# Patient Record
Sex: MALE | Race: WHITE
[De-identification: names, ages, dates, MRNs, and addresses within clinical notes are randomized per-mention and may not be internally consistent; named-entity substitution may affect disease eponyms.]

---

## 2018-02-08 ENCOUNTER — TRANSCRIPTION ENCOUNTER (OUTPATIENT)
Age: 23
End: 2018-02-08

## 2019-01-31 ENCOUNTER — TRANSCRIPTION ENCOUNTER (OUTPATIENT)
Age: 24
End: 2019-01-31

## 2019-10-01 ENCOUNTER — MEDICATION RENEWAL (OUTPATIENT)
Age: 24
End: 2019-10-01

## 2019-10-03 ENCOUNTER — MEDICATION RENEWAL (OUTPATIENT)
Age: 24
End: 2019-10-03

## 2019-11-15 ENCOUNTER — APPOINTMENT (OUTPATIENT)
Dept: NEUROLOGY | Facility: CLINIC | Age: 24
End: 2019-11-15
Payer: MEDICARE

## 2019-11-15 VITALS
HEIGHT: 69 IN | TEMPERATURE: 98.4 F | SYSTOLIC BLOOD PRESSURE: 119 MMHG | WEIGHT: 265.7 LBS | BODY MASS INDEX: 39.35 KG/M2 | OXYGEN SATURATION: 99 % | DIASTOLIC BLOOD PRESSURE: 69 MMHG | HEART RATE: 75 BPM

## 2019-11-15 DIAGNOSIS — Q04.0 CONGENITAL MALFORMATIONS OF CORPUS CALLOSUM: ICD-10-CM

## 2019-11-15 DIAGNOSIS — Q01.9 ENCEPHALOCELE, UNSPECIFIED: ICD-10-CM

## 2019-11-15 DIAGNOSIS — G40.909 EPILEPSY, UNSPECIFIED, NOT INTRACTABLE, W/OUT STATUS EPILEPTICUS: ICD-10-CM

## 2019-11-15 DIAGNOSIS — Q04.3 OTHER REDUCTION DEFORMITIES OF BRAIN: ICD-10-CM

## 2019-11-15 PROCEDURE — 99204 OFFICE O/P NEW MOD 45 MIN: CPT

## 2019-11-15 RX ORDER — CETIRIZINE HCL 10 MG
10 TABLET ORAL
Refills: 0 | Status: ACTIVE | COMMUNITY

## 2019-11-17 NOTE — HISTORY OF PRESENT ILLNESS
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 Pt is here today as he is being transitioned to adult neurology. Pt is accompanied by mom. As per mom who is the primary historian pt started having seizures at 4 month of age due to hydrocephalies that required  shunt placements with numerous revisions. By age 7-8 pt was doing well and was seizure free. Was taped off all  AEDs and was doing well for about 10 years. At ~ age 18 he started having episodes of starring, spacing out and fine upper body shaking that usually lasted less than a minute and than pt was back to baseline. Pt was placed back on AEDs and is currently taking Keppra 1000 mg po q 12hrs and Zonisamide 150 mg po q 12 hrs. \par Mom thinks pt has been seizure free for about 2 years, however she is not certain as 5 days a week Almas goes to program and she feels that memebers of the team can easily miss it if he does have an episode. Pt saw Dr. Eng 1.5 years ago and had last REEG on 2017 which was normal as well as a VEEG in 2016 which was abnormal (right frontotemporal slowing). \par \par Social: [facility]. Lives at home with mom. Has developmental delays. Pt finished high school. Did not attend college.

## 2019-11-17 NOTE — DISCUSSION/SUMMARY
[Safety Recommendations] : The patient was advised in regards to the risk of seizures and general seizure safety recommendations including not to be bathing alone, climbing to high places and operating heavy machinery. [Bone Health Education] : Patient was educated in regards to bone health and an increased risk of osteoporosis in patients with epilepsy. [Compliance with Medications] : The importance of compliance with medications was reinforced. [Sleep Hygiene/Sleep Disruption Risks] : Sleep hygiene and the risks of sleep disruption were discussed. 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 Seizure disorver is well controlled. Will continue current dose of AEDs, rx given for drug levels to be checked within the next week. Pt will also need a REEG and possible VEEG to evaluate epileptiform activity and subclinical seizures. Spoke with pt and mom at lengths about weight loss and introducing daily exercise. If pt unsuccessful with weight loss will refer pt for sleep study to evaluate his snoring and need of CPAP.\par Case discussed with Dr. Samuels.\par \par Cris Meyer, DNP, ACNP-BC

## 2019-11-17 NOTE — DATA REVIEWED
[de-identified] : date of test 2015\par Clinical History / Reason for exam: Seizures. History of  shunt placement.\par   \par Technique: Sagittal T1 and axial T1, FLAIR, T2, gradient echo and diffusion\par sequences of the brain were obtained without administration of intravenous\par contrast. Multiplanar thin section sequences through the sinuses were also\par obtained.\par   \par COMPARISON: MRI brain September 29, 2013.\par   \par Findings: \par   \par The patient is post suboccipital craniotomy. There is bifrontal and right\par parietal kirk holes. There are bifrontal and right parietal shunt catheters. The\par right frontal shunt catheter appears to terminate in the posterior right lateral\par ventricle. The tip of the left frontal catheter appears to terminate the in the\par region of the third ventricle. Right parietal shunt catheter terminates in in\par the frontal horn of the left lateral ventricle.\par   \par The configuration of the ventricles is unchanged with a parallel configuration\par of the lateral ventricles with third ventricle superior in location. Stable mild\par dilatation of the atrial of the lateral ventricles consistent with colpocephaly.\par Ventricular size is stable. \par   \par Congenital agenesis of the corpus callosum again identified.\par   \par There are stable findings consistent with Chiari 2 malformation including a\par towering cerebellum, slight beaking of tectum and inferior displacement of the\par cerebellar tonsils (approximately 1.6 cm). \par   \par There is stable cyst posterior superior to the left lateral ventricle measuring\par approximately 3.2 cm.\par   \par There is thickening and irregular architecture of the grey matter surrounding a\par right medial occipital lobe sulcus (best visualized on coronal series 10 images\par 134 - 148). Additional significant gray matter is noted within the left frontal\par lobe medially (series 5 image 19). These areas most consistent with\par polymicrogyria.\par   \par Patchy foci of hyperintense periventricular FLAIR signal and along the course\par of the shunt catheters are unchanged.\par   \par The left frontal lobe is slightly lower in location compared to the right,\par unchanged from prior examination.\par   \par Impression:\par   \par No significant changes from the prior study.\par   \par 1. Thickening and irregular architecture of the grey matter surrounding a right\par medial occipital lobe sulcus consistent with polymicrogyria. Similar less\par pronounced appearance of the gray matter in the left medial frontal lobe.\par Correlate with EEG findings.\par   \par 2. Stable configuration of ventricles and sulci, with findings consistent with\par Chiari II malformation and agenesis of the corpus callosum.Stable cyst posterior\par to the lateral aspect of the left lateral ventricle.\par   \par 4. Post suboccipital craniotomy and bifrontal/biparietal  shunt catheters in\par stable positioning. Stable ventricular size and configuration.\par

## 2019-11-17 NOTE — PHYSICAL EXAM
[General Appearance - Alert] : alert [General Appearance - In No Acute Distress] : in no acute distress [Oriented To Time, Place, And Person] : oriented to person, place, and time [Mood] : the mood was normal [Person] : oriented to person [Place] : oriented to place [Time] : oriented to time [Naming Objects] : no difficulty naming common objects [Repeating Phrases] : no difficulty repeating a phrase [Cranial Nerves Oculomotor (III)] : extraocular motion intact [Cranial Nerves Optic (II)] : visual acuity intact bilaterally,  visual fields full to confrontation, pupils equal round and reactive to light [Cranial Nerves Facial (VII)] : face symmetrical [Cranial Nerves Vestibulocochlear (VIII)] : hearing was intact bilaterally [Cranial Nerves Trigeminal (V)] : facial sensation intact symmetrically [Cranial Nerves Accessory (XI - Cranial And Spinal)] : head turning and shoulder shrug symmetric [Cranial Nerves Glossopharyngeal (IX)] : tongue and palate midline [Cranial Nerves Hypoglossal (XII)] : there was no tongue deviation with protrusion [Involuntary Movements] : no involuntary movements were seen [Motor Handedness Right-Handed] : the patient is right hand dominant [No Muscle Atrophy] : normal bulk in all four extremities [Balance] : balance was intact [3+] : Ankle jerk left 3+ [Paresis Pronator Drift Right-Sided] : no pronator drift on the right [Romberg's Sign] : Romberg's sign was negtive [Past-pointing] : there was no past-pointing [Limited Balance] : balance was intact [Paresis Pronator Drift Left-Sided] : no pronator drift on the left [Dysdiadochokinesia Bilaterally] : not present [Tremor] : no tremor present [Coordination - Dysmetria Impaired Finger-to-Nose Bilateral] : not present [FreeTextEntry8] : Poor posture. Ambulates without assistance.  [FreeTextEntry5] : Slow marietta of speech.

## 2019-12-19 ENCOUNTER — APPOINTMENT (OUTPATIENT)
Dept: NEUROLOGY | Facility: CLINIC | Age: 24
End: 2019-12-19
Payer: MEDICARE

## 2019-12-19 PROCEDURE — 95816 EEG AWAKE AND DROWSY: CPT

## 2020-03-26 ENCOUNTER — APPOINTMENT (OUTPATIENT)
Dept: NEUROLOGY | Facility: CLINIC | Age: 25
End: 2020-03-26

## 2020-05-29 RX ORDER — LEVETIRACETAM 1000 MG/1
1000 TABLET, FILM COATED ORAL
Qty: 120 | Refills: 1 | Status: COMPLETED | COMMUNITY
Start: 2019-10-03 | End: 2020-09-26

## 2020-06-01 RX ORDER — ZONISAMIDE 50 MG/1
50 CAPSULE ORAL
Qty: 120 | Refills: 1 | Status: COMPLETED | COMMUNITY
Start: 2019-10-03 | End: 2020-09-29

## 2020-06-11 ENCOUNTER — APPOINTMENT (OUTPATIENT)
Dept: NEUROLOGY | Facility: CLINIC | Age: 25
End: 2020-06-11
Payer: MEDICARE

## 2020-06-11 PROCEDURE — 99214 OFFICE O/P EST MOD 30 MIN: CPT | Mod: 95

## 2020-06-11 NOTE — HISTORY OF PRESENT ILLNESS
[Home] : at home, [unfilled] , at the time of the visit. [FreeTextEntry1] : They agreed with the telehealth visit and have no questions.\par He has been doing well until a week ago that had a GTC . according to the Mom he has had no episodes prior to that and has been doing well. intentionally with the dieting he lost about 45 pounds.\par he is not going to the program and doing some online interactions with the group . He says for the most part sleeps well and does not feel tired or sleepy during the day. he is not snoring anymore.\par The seizure happened around 9.30 am perhaps half an hour or less after taking his morning dose. he is very compliant with his medications.\par his mood and performance is described as normal. good appetite. No fever\par No headache. No N/V. No vertigo\par No falls\par EEG done in Dec. shows bilateral independent spikes Rt> left\par Blood test . Keppra level is 21 [Medical Office: ([facility])___] : at the medical office located in

## 2020-06-11 NOTE — ASSESSMENT
[FreeTextEntry1] : Hydrocephelus\par \par \par Polymirogyria\par \par Partial epilepsy\par \par \par Plan continue med\par blood test\par F/U\par

## 2020-06-11 NOTE — PHYSICAL EXAM
[FreeTextEntry1] : A/A/Ox3\par psychomotor slowing\par good mood\par slightly dysarthric speech\par no drift\par no dysmetria\par slightly clumsy with fine motr

## 2020-07-08 ENCOUNTER — APPOINTMENT (OUTPATIENT)
Dept: NEUROLOGY | Facility: CLINIC | Age: 25
End: 2020-07-08

## 2020-07-21 NOTE — HISTORY OF PRESENT ILLNESS
[FreeTextEntry1] : Almas had another episode that Mom believes it was GTC. He could not get up after the event but texted mom to hep him. No clear precipitating cause.\par the levels were checked \par will increase the ZNS to 100 am and 300 hs\par will speak on the phone\par asked Mom to have an eye on his sleep pattern

## 2020-07-24 RX ORDER — ZONISAMIDE 100 MG/1
100 CAPSULE ORAL
Qty: 120 | Refills: 3 | Status: COMPLETED | COMMUNITY
Start: 2019-10-03 | End: 2020-11-21

## 2020-11-25 ENCOUNTER — APPOINTMENT (OUTPATIENT)
Dept: NEUROLOGY | Facility: CLINIC | Age: 25
End: 2020-11-25

## 2020-12-16 ENCOUNTER — APPOINTMENT (OUTPATIENT)
Dept: NEUROLOGY | Facility: CLINIC | Age: 25
End: 2020-12-16

## 2021-01-18 ENCOUNTER — EMERGENCY (EMERGENCY)
Facility: HOSPITAL | Age: 26
LOS: 0 days | Discharge: HOME | End: 2021-01-19
Attending: EMERGENCY MEDICINE | Admitting: EMERGENCY MEDICINE
Payer: MEDICARE

## 2021-01-18 VITALS
WEIGHT: 216.05 LBS | TEMPERATURE: 99 F | SYSTOLIC BLOOD PRESSURE: 125 MMHG | HEART RATE: 103 BPM | RESPIRATION RATE: 18 BRPM | OXYGEN SATURATION: 98 % | DIASTOLIC BLOOD PRESSURE: 69 MMHG

## 2021-01-18 DIAGNOSIS — Z79.899 OTHER LONG TERM (CURRENT) DRUG THERAPY: ICD-10-CM

## 2021-01-18 DIAGNOSIS — Z98.2 PRESENCE OF CEREBROSPINAL FLUID DRAINAGE DEVICE: Chronic | ICD-10-CM

## 2021-01-18 DIAGNOSIS — R56.9 UNSPECIFIED CONVULSIONS: ICD-10-CM

## 2021-01-18 DIAGNOSIS — Z98.890 OTHER SPECIFIED POSTPROCEDURAL STATES: ICD-10-CM

## 2021-01-18 LAB
ALBUMIN SERPL ELPH-MCNC: 4.6 G/DL — SIGNIFICANT CHANGE UP (ref 3.5–5.2)
ALP SERPL-CCNC: 60 U/L — SIGNIFICANT CHANGE UP (ref 30–115)
ALT FLD-CCNC: 22 U/L — SIGNIFICANT CHANGE UP (ref 0–41)
ANION GAP SERPL CALC-SCNC: 13 MMOL/L — SIGNIFICANT CHANGE UP (ref 7–14)
AST SERPL-CCNC: 14 U/L — SIGNIFICANT CHANGE UP (ref 0–41)
BASOPHILS # BLD AUTO: 0.03 K/UL — SIGNIFICANT CHANGE UP (ref 0–0.2)
BASOPHILS NFR BLD AUTO: 0.3 % — SIGNIFICANT CHANGE UP (ref 0–1)
BILIRUB SERPL-MCNC: 0.2 MG/DL — SIGNIFICANT CHANGE UP (ref 0.2–1.2)
BUN SERPL-MCNC: 19 MG/DL — SIGNIFICANT CHANGE UP (ref 10–20)
CALCIUM SERPL-MCNC: 9.6 MG/DL — SIGNIFICANT CHANGE UP (ref 8.5–10.1)
CHLORIDE SERPL-SCNC: 109 MMOL/L — SIGNIFICANT CHANGE UP (ref 98–110)
CO2 SERPL-SCNC: 19 MMOL/L — SIGNIFICANT CHANGE UP (ref 17–32)
CREAT SERPL-MCNC: 1 MG/DL — SIGNIFICANT CHANGE UP (ref 0.7–1.5)
EOSINOPHIL # BLD AUTO: 0.08 K/UL — SIGNIFICANT CHANGE UP (ref 0–0.7)
EOSINOPHIL NFR BLD AUTO: 0.7 % — SIGNIFICANT CHANGE UP (ref 0–8)
GLUCOSE SERPL-MCNC: 90 MG/DL — SIGNIFICANT CHANGE UP (ref 70–99)
HCT VFR BLD CALC: 48.2 % — SIGNIFICANT CHANGE UP (ref 42–52)
HGB BLD-MCNC: 16.4 G/DL — SIGNIFICANT CHANGE UP (ref 14–18)
IMM GRANULOCYTES NFR BLD AUTO: 0.4 % — HIGH (ref 0.1–0.3)
LYMPHOCYTES # BLD AUTO: 2.24 K/UL — SIGNIFICANT CHANGE UP (ref 1.2–3.4)
LYMPHOCYTES # BLD AUTO: 20.5 % — SIGNIFICANT CHANGE UP (ref 20.5–51.1)
MAGNESIUM SERPL-MCNC: 2 MG/DL — SIGNIFICANT CHANGE UP (ref 1.8–2.4)
MCHC RBC-ENTMCNC: 29.9 PG — SIGNIFICANT CHANGE UP (ref 27–31)
MCHC RBC-ENTMCNC: 34 G/DL — SIGNIFICANT CHANGE UP (ref 32–37)
MCV RBC AUTO: 88 FL — SIGNIFICANT CHANGE UP (ref 80–94)
MONOCYTES # BLD AUTO: 0.75 K/UL — HIGH (ref 0.1–0.6)
MONOCYTES NFR BLD AUTO: 6.8 % — SIGNIFICANT CHANGE UP (ref 1.7–9.3)
NEUTROPHILS # BLD AUTO: 7.81 K/UL — HIGH (ref 1.4–6.5)
NEUTROPHILS NFR BLD AUTO: 71.3 % — SIGNIFICANT CHANGE UP (ref 42.2–75.2)
NRBC # BLD: 0 /100 WBCS — SIGNIFICANT CHANGE UP (ref 0–0)
PLATELET # BLD AUTO: 277 K/UL — SIGNIFICANT CHANGE UP (ref 130–400)
POTASSIUM SERPL-MCNC: 4.1 MMOL/L — SIGNIFICANT CHANGE UP (ref 3.5–5)
POTASSIUM SERPL-SCNC: 4.1 MMOL/L — SIGNIFICANT CHANGE UP (ref 3.5–5)
PROT SERPL-MCNC: 7.6 G/DL — SIGNIFICANT CHANGE UP (ref 6–8)
RBC # BLD: 5.48 M/UL — SIGNIFICANT CHANGE UP (ref 4.7–6.1)
RBC # FLD: 12.5 % — SIGNIFICANT CHANGE UP (ref 11.5–14.5)
SODIUM SERPL-SCNC: 141 MMOL/L — SIGNIFICANT CHANGE UP (ref 135–146)
WBC # BLD: 10.95 K/UL — HIGH (ref 4.8–10.8)
WBC # FLD AUTO: 10.95 K/UL — HIGH (ref 4.8–10.8)

## 2021-01-18 PROCEDURE — 71045 X-RAY EXAM CHEST 1 VIEW: CPT | Mod: 26

## 2021-01-18 PROCEDURE — 75809 NONVASCULAR SHUNT X-RAY: CPT | Mod: 26

## 2021-01-18 PROCEDURE — 99285 EMERGENCY DEPT VISIT HI MDM: CPT

## 2021-01-18 PROCEDURE — 70450 CT HEAD/BRAIN W/O DYE: CPT | Mod: 26

## 2021-01-18 RX ORDER — SODIUM CHLORIDE 9 MG/ML
1000 INJECTION INTRAMUSCULAR; INTRAVENOUS; SUBCUTANEOUS ONCE
Refills: 0 | Status: COMPLETED | OUTPATIENT
Start: 2021-01-18 | End: 2021-01-18

## 2021-01-18 RX ORDER — ZONISAMIDE 100 MG
300 CAPSULE ORAL ONCE
Refills: 0 | Status: COMPLETED | OUTPATIENT
Start: 2021-01-18 | End: 2021-01-18

## 2021-01-18 RX ORDER — ZONISAMIDE 100 MG
2 CAPSULE ORAL
Qty: 0 | Refills: 0 | DISCHARGE

## 2021-01-18 RX ORDER — ZONISAMIDE 100 MG
3 CAPSULE ORAL
Qty: 0 | Refills: 0 | DISCHARGE

## 2021-01-18 RX ORDER — LEVETIRACETAM 250 MG/1
1000 TABLET, FILM COATED ORAL ONCE
Refills: 0 | Status: COMPLETED | OUTPATIENT
Start: 2021-01-18 | End: 2021-01-18

## 2021-01-18 RX ORDER — LEVETIRACETAM 250 MG/1
2 TABLET, FILM COATED ORAL
Qty: 0 | Refills: 0 | DISCHARGE

## 2021-01-18 RX ORDER — LEVETIRACETAM 250 MG/1
1500 TABLET, FILM COATED ORAL ONCE
Refills: 0 | Status: COMPLETED | OUTPATIENT
Start: 2021-01-18 | End: 2021-01-18

## 2021-01-18 RX ADMIN — LEVETIRACETAM 1500 MILLIGRAM(S): 250 TABLET, FILM COATED ORAL at 20:16

## 2021-01-18 RX ADMIN — LEVETIRACETAM 400 MILLIGRAM(S): 250 TABLET, FILM COATED ORAL at 22:45

## 2021-01-18 RX ADMIN — Medication 300 MILLIGRAM(S): at 20:16

## 2021-01-18 RX ADMIN — SODIUM CHLORIDE 1000 MILLILITER(S): 9 INJECTION INTRAMUSCULAR; INTRAVENOUS; SUBCUTANEOUS at 20:16

## 2021-01-18 NOTE — ED PROVIDER NOTE - NSFOLLOWUPINSTRUCTIONS_ED_ALL_ED_FT
Follow up with your primary care doctor and neurologist in 1-2 days     Seizure in Adults    WHAT YOU NEED TO KNOW:    A seizure is a burst of electrical activity in your brain. A seizure may start in one part of your brain, or both sides may be affected. The seizure may last a few seconds or up to 5 minutes. A new-onset seizure is a seizure that happens for the first time. Some common triggers are alcohol, drugs, lack of sleep, fever, or an infection. High or low blood sugar levels, pregnancy, a head injury, or a stroke could also trigger a seizure. The cause of your seizure may not be known. You have a higher risk for another seizure within the next 2 years.    DISCHARGE INSTRUCTIONS:    Call your local emergency number (911 in the [place]) or have someone else call for any of the following:     Your seizure lasts longer than 5 minutes.      You have a second seizure that happens within 24 hours of your first.      You have trouble breathing after a seizure.      You cannot be woken after your seizure.      You have more than 1 seizure before you are fully awake or aware.    Call your doctor if:     You are injured during a seizure.      You have a fever.      You are planning to get pregnant or are currently pregnant.      You have questions or concerns about your condition or care.    Medicines: You may be given the following:     Antiepileptic medicine may control or prevent another seizure. Do not stop taking this medicine without direction from a healthcare provider.      Antibiotics treat an infection caused by bacteria.      Take your medicine as directed. Contact your healthcare provider if you think your medicine is not helping or if you have side effects. Tell him or her if you are allergic to any medicine. Keep a list of the medicines, vitamins, and herbs you take. Include the amounts, and when and why you take them. Bring the list or the pill bottles to follow-up visits. Carry your medicine list with you in case of an emergency.    What you can do to manage or prevent a seizure:     Manage stress. Stress can trigger a seizure. Exercise can help you reduce stress. Talk to your healthcare provider about exercise that is safe for you. Other ways to manage stress include yoga, meditation, and biofeedback. Illness can be a form of stress. Eat a variety of healthy foods and drink plenty of liquids during an illness.      Set a regular sleep schedule. A lack of sleep can trigger a seizure. Try to go to sleep and wake up at the same times every day. Keep your bedroom quiet and dark. Talk to your healthcare provider if you are having trouble sleeping.      Manage other medical conditions. Manage other health conditions that may increase your risk for a seizure. Keep your blood sugar levels and blood pressure under control.      Limit or do not drink alcohol as directed. Alcohol can trigger a seizure, especially if you drink a large amount at one time. A drink of alcohol is 12 ounces of beer, 1½ ounces of liquor, or 5 ounces of wine. Talk to your healthcare provider about a safe amount of alcohol for you. Your provider may recommend that you do not drink any alcohol. Tell him or her if you need help to quit drinking.      Ask what safety precautions you should take. Talk with your healthcare provider about driving. You may not be able to drive until you are seizure-free for a period of time. You will need to check the law where you live. Also talk to your healthcare provider about swimming and bathing. You may drown or develop life-threatening heart or lung damage if you have a seizure in water.      Tell your friends, family members, and coworkers that you had a seizure. Give them written instructions to follow if you have another seizure.    Follow up with your healthcare provider or neurologist as directed: You may need more tests to find the cause of your seizure. Write down your questions so you remember to ask them during your visits.        © Copyright [employer] 2019 All illustrations and images included in CareNotes are the copyrighted property of [employer] or [employer]

## 2021-01-18 NOTE — ED PROVIDER NOTE - PHYSICAL EXAMINATION
GENERAL: Well-nourished, Well-developed. NAD. comfortable  HEAD: Normocephalic, atraumatic  Eyes: PERRLA, EOMI. No asymmetry. No nystagmus. No conjunctival injection. Non-icteric sclera.  Neck: Supple. FROM  CVS: Normal S1,S2. No murmurs appreciated on auscultation   RESP: Lungs clear to auscultation B/L. No wheezing, rales, or rhonchi auscultated.  GI: Soft, Nontender, Nondistended. No guarding or rebound tenderness.   MSK: Extremities w/o deformity or ttp. No visible signs of trauma such as ecchymosis, erythema, or swelling. FROM of upper and lower extremities B/L. No midline spinal TTP. FROM of back with flexion and extension.  Skin: Warm, Dry. No rashes or lesions. Good cap refill < 2 sec B/L.  EXT: Radial and pedal pulses present B/L.   Neuro: AA&O x 3. Speaking in full sentences. No slurring of speech. No facial droop. No tremors. Sensation grossly intact. Strength 5/5 R side, 4/5 L side (chronic, baseline weakness). Normal Finger to nose exam.

## 2021-01-18 NOTE — ED PROVIDER NOTE - PATIENT PORTAL LINK FT
You can access the FollowMyHealth Patient Portal offered by [facility] by registering at the following website: [email or web address]. By joining [employer]’s FollowMyHealth portal, you will also be able to view your health information using other applications (apps) compatible with our system.

## 2021-01-18 NOTE — ED PROVIDER NOTE - PROGRESS NOTE DETAILS
Results d/w patient and family and copies of results provided.  Pt instructed to return if any worsening symptoms or concerns.  Discussed with patient follow up and care plan. They verbalize understanding all discussed and all questions answered.. I was directly involved in the care of this patient. PA Fellow Negrito note/plan reviewed and agreed.

## 2021-01-18 NOTE — ED PROVIDER NOTE - CLINICAL SUMMARY MEDICAL DECISION MAKING FREE TEXT BOX
25yM pmhx seizures   - has  shutn since 5 montsh old - followed with jovanny  until no longer pediatric  now sees Dr rowe at [facility] -  Since  June has been having seizures GTC -  head shaking  , left side  shaking and left todds paralysis,  every month,  since october  2x a month -  Has been following with  [facility] - has eeg in [place]  that was unremarkable -   his keppra  and zonegran have been increasing -  Pt currently on  1500mg BID  ( was 1250 mg BID- last month - decemebr),  now on 300mg ZOnegran -  pt complaint - here today for 3 seizures  all same appearance as usual for him -  but  last time had 2 n  one  day - now with  3 -  all lasting about a minute -  occurred at 1130am, 1230pm and  most recent 630pm -  Pt  with mild weakness to LUE, LLE,  awake alert -  mother saw seizure activity on the camera she has  in his bedroom   Alert and oriented.  CN 2-12 intact. 4/5 LUE  4/5 LLE, 5/5 right sided,. No tremor 25yM pmhx seizures   - has  shutn since 5 montsh old - followed with jovanny  until no longer pediatric  now sees Dr rowe at [facility] -  Since  June has been having seizures GTC -  head shaking  , left side  shaking and left todds paralysis,  every month,  since october  2x a month -  Has been following with  [facility] - has eeg in Oct  that was unremarkable -   his keppra  and zonegran have been increasing -  Pt currently on  1500mg BID  ( was 1250 mg BID- last month - decemebr),  now on 300mg ZOnegran -  pt complaint - here today for 3 seizures  all same appearance as usual for him -  but  last time had 2 n  one  day - now with  3 -  all lasting about a minute -  occurred at 1130am, 1230pm and  most recent 630pm -  Pt  with mild weakness to LUE, LLE,  awake alert -  mother saw seizure activity on the camera she has  in his bedroom   Alert and oriented.  CN 2-12 intact. 4/5 LUE  4/5 LLE, 5/5 right sided,. No tremor    CT head  Redemonstration of ventricular shunt in stable positions. 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 Parallel configuration of the ventricles with colpocephaly, compatible with agenesis of the corpus callosum. Crowding of the foramen magnum, compatible with Chiari malformation.  shunt series  xray  no blockage -    patient given his home PO dose of antiepileptics.  DW patients neuro - on call doctor aware of details of case -  shared decision making  with family to dc rd e-  per neuro give additional loading dose of keppra IV -  and  contact their neurologist in the morning  mother feel comfortable with this = pt dcd alert well appearing condition -

## 2021-01-18 NOTE — ED PROVIDER NOTE - NS ED ROS FT
Constitutional: (-) fever (-) malaise (-) diaphoresis (-) chills (-) wt. loss  Eyes: (-) visual changes  Neck: (-) neck pain (-) neck stiffness  Cardiac: (-) chest pain  (-) palpitations (-) syncope   Respiratory: (-) cough (-) hemoptysis (-) SOB   GI: (-) nausea (-) vomiting (-) diarrhea (-) abdominal pain  : (-) dysuria (-) increased frequency  (-) hematuria (-) incontinence  MS: (-) back pain (-) myalgia (-) muscle weakness (-)  joint pain  Neuro: (-) headache (-) dizziness (-) numbness/tingling to extremities B/L (-) weakness (-) LOC (-) head injury (-) AMS (-) saddle anesthesia (-) tremors  Skin: (-) rash (-) laceration    Except as documented in the HPI, all other systems are negative.

## 2021-01-18 NOTE — ED PROVIDER NOTE - OBJECTIVE STATEMENT
26 yo M pmh seizures, hydrocephalus s/p  shunt BIBEMS presenting to the ED with his mother for evaluation of 3 witnessed seizures earlier today. As per mother pt has a seizure today at 11:30am, 2:30pm, and 6:30pm, pt has a history of seizures, takes Keppra and Zonegran, compliant with both medications, took meds this am. Mother reports after first seizure today she gave pt Nayzilam spray after talking to his Neurologist Dr. Collins at [facility]. Mother reports pt was in bed during first 2 seizures, for the 3rd seizure he was seated in a chair, never fell to the ground, no LOC, no head trauma. As per mother seizure activity was same as previous seizures, left sided shaking for a few minutes. Last seizure was November 28. As per mother pt has been having seizures twice per month since October, recently had Keppra dose increased by Neurologist due to increased seizure activity. Denies fever, chills, nausea, vomiting, incontinence, dizziness, abdominal pain, extremity pain, headache, visual changes, chest pain, sob.

## 2021-01-19 VITALS — SYSTOLIC BLOOD PRESSURE: 130 MMHG | DIASTOLIC BLOOD PRESSURE: 61 MMHG | OXYGEN SATURATION: 99 % | HEART RATE: 88 BPM

## 2021-11-03 ENCOUNTER — TRANSCRIPTION ENCOUNTER (OUTPATIENT)
Age: 26
End: 2021-11-03

## 2022-04-27 ENCOUNTER — TRANSCRIPTION ENCOUNTER (OUTPATIENT)
Age: 27
End: 2022-04-27

## 2022-08-10 ENCOUNTER — NON-APPOINTMENT (OUTPATIENT)
Age: 27
End: 2022-08-10

## 2024-03-13 ENCOUNTER — EMERGENCY (EMERGENCY)
Facility: HOSPITAL | Age: 29
LOS: 0 days | Discharge: HOME | End: 2024-03-13
Attending: STUDENT IN AN ORGANIZED HEALTH CARE EDUCATION/TRAINING PROGRAM
Payer: MEDICARE

## 2024-03-13 VITALS
HEIGHT: 70 IN | SYSTOLIC BLOOD PRESSURE: 133 MMHG | TEMPERATURE: 98 F | OXYGEN SATURATION: 98 % | DIASTOLIC BLOOD PRESSURE: 81 MMHG | RESPIRATION RATE: 20 BRPM | WEIGHT: 240.08 LBS | HEART RATE: 84 BPM

## 2024-03-13 VITALS
SYSTOLIC BLOOD PRESSURE: 121 MMHG | HEART RATE: 85 BPM | DIASTOLIC BLOOD PRESSURE: 77 MMHG | RESPIRATION RATE: 18 BRPM | OXYGEN SATURATION: 98 %

## 2024-03-13 DIAGNOSIS — S60.512A ABRASION OF LEFT HAND, INITIAL ENCOUNTER: ICD-10-CM

## 2024-03-13 DIAGNOSIS — S80.212A ABRASION, LEFT KNEE, INITIAL ENCOUNTER: ICD-10-CM
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
## 2024-03-13 DIAGNOSIS — Z98.2 PRESENCE OF CEREBROSPINAL FLUID DRAINAGE DEVICE: ICD-10-CM

## 2024-03-13 DIAGNOSIS — Z98.2 PRESENCE OF CEREBROSPINAL FLUID DRAINAGE DEVICE: Chronic | ICD-10-CM

## 2024-03-13 DIAGNOSIS — S00.81XA ABRASION OF OTHER PART OF HEAD, INITIAL ENCOUNTER: ICD-10-CM
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
## 2024-03-13 DIAGNOSIS — S03.2XXA DISLOCATION OF TOOTH, INITIAL ENCOUNTER: ICD-10-CM

## 2024-03-13 DIAGNOSIS — Z04.3 ENCOUNTER FOR EXAMINATION AND OBSERVATION FOLLOWING OTHER ACCIDENT: ICD-10-CM

## 2024-03-13 DIAGNOSIS — M41.9 SCOLIOSIS, UNSPECIFIED: ICD-10-CM

## 2024-03-13 PROBLEM — R56.9 UNSPECIFIED CONVULSIONS: Chronic | Status: ACTIVE | Noted: 2021-01-18

## 2024-03-13 PROCEDURE — 70450 CT HEAD/BRAIN W/O DYE: CPT | Mod: 26,MC

## 2024-03-13 PROCEDURE — 71045 X-RAY EXAM CHEST 1 VIEW: CPT

## 2024-03-13 PROCEDURE — 99284 EMERGENCY DEPT VISIT MOD MDM: CPT | Mod: 25

## 2024-03-13 PROCEDURE — 74018 RADEX ABDOMEN 1 VIEW: CPT

## 2024-03-13 PROCEDURE — 99284 EMERGENCY DEPT VISIT MOD MDM: CPT

## 2024-03-13 PROCEDURE — 70450 CT HEAD/BRAIN W/O DYE: CPT | Mod: MC

## 2024-03-13 PROCEDURE — 75809 NONVASCULAR SHUNT X-RAY: CPT

## 2024-03-13 PROCEDURE — 73562 X-RAY EXAM OF KNEE 3: CPT | Mod: 26,LT

## 2024-03-13 PROCEDURE — 73562 X-RAY EXAM OF KNEE 3: CPT | Mod: LT

## 2024-03-13 PROCEDURE — 70250 X-RAY EXAM OF SKULL: CPT

## 2024-03-13 NOTE — ED PROVIDER NOTE - NSFOLLOWUPINSTRUCTIONS_ED_ALL_ED_FT
Please follow up with your primary care doctor 1-3 days.     Abrasion  An abrasion is a cut or a scrape on your skin. You must take care of your wound so germs do not get in it and cause infection.    What are the causes?  This condition is caused by rubbing your skin on something or falling on a surface, such as the ground. When your skin rubs on something, some layers of skin may rub off.    What are the signs or symptoms?  A cut or a scrape.  Bleeding.  A red or pink spot.  A bruise under your wound.  How is this treated?  This condition may be treated with:  Cleaning your wound.  Putting ointment on your wound.  Putting a bandage on your wound.  Getting a tetanus shot.  Follow these instructions at home:  Your doctor may tell you to do these things:    Medicines    Take or use over-the-counter and prescription medicines only as told by your doctor.  If you were prescribed an antibiotic medicine, use it as told by your doctor. Do not stop using it even if you start to feel better.  Keep your wound clean    Clean your wound 1 or 2 times a day or as often told by your doctor. To do this:  Wash your hands for at least 20 seconds with mild soap and water. Do this before and after you clean your wound.  Wash your wound with mild soap and water.  Rinse off the soap.  Pat your wound with a clean towel to dry it. Do not rub your wound.  Keep your bandage clean and dry. Take it off and change it as told by your doctor.  You may have to change your bandage one or more times a day, or as told by your doctor.  Watch for signs of infection    Check your wound every day for signs of infection. Check for:  A red streak that goes away from your wound.  Other redness.  Swelling or more pain.  Warmth.  Blood, fluid, pus, or a bad smell.  Treat pain and swelling      If told, put ice on the injured area. To do this:  Put ice in a plastic bag.  Place a towel between your skin and the bag.  Leave the ice on for 20 minutes, 2–3 times a day.  Take off the ice if your skin turns bright red. This is very important. If you cannot feel pain, heat, or cold, you have a greater risk of damage to the area.  If you can, raise the injured area above the level of your heart while you are sitting or lying down.  General instructions    Do not take baths, swim, or use a hot tub. Ask your doctor about taking showers or sponge baths.  Keep all follow-up visits.  Contact a doctor if:  You had a tetanus shot, and you have any of these where the needle went in:  Swelling.  Very bad pain.  Redness.  Bleeding.  You have a lot of pain, and medicine does not help.  You have a fever.  You have any of these signs of infection in your wound:  Redness, swelling, or more pain.  Blood, fluid, pus, or a bad smell.  Warmth.  Get help right away if:  You have a red streak going away from your wound.  Summary  An abrasion is a cut or a scrape on your skin. Take care of your wound so germs do not get in it.  Clean your wound 1 or 2 times a day or as often as told. Change your bandage as told and use medicines as told.  Call your doctor if you have a fever or if you have redness, swelling, or more pain in your wound.  Call your doctor if you have blood, fluid, pus, or a bad smell in your wound.  Get help right away if you have a red streak going away from your wound.  This information is not intended to replace advice given to you by your health care provider. Make sure you discuss any questions you have with your health care provider.

## 2024-03-13 NOTE — ED ADULT TRIAGE NOTE - CHIEF COMPLAINT QUOTE
Patient tripped and fell on concrete, hit face on sidewalk. Abrasions noted to right cheek, patient c/o left knee pain

## 2024-03-13 NOTE — ED PROVIDER NOTE - ATTENDING APP SHARED VISIT CONTRIBUTION OF CARE
I personally evaluated the patient. I reviewed the Resident’s or Physician Assistant’s note (as assigned above), and agree with the findings and plan except as documented in my note.    28-year-old man history of seizures has not had a seizure in a while developmental delay with a  shunt presents with his mother.  Patient was at a program when he tripped and fell no LOC not on anticoagulation staff immediately saw him not lose consciousness and was not postictal mother states tetanus is up-to-date   CONSTITUTIONAL: WA / WN / NAD  HEAD: +right facial abrasions  EYES: PERRL; EOMI;   ENT: Normal pharynx; mucous membranes pink/moist, no erythema. +tooth #8 avulsion   NECK: Supple; no meningeal signs  CARD: RRR; nl S1/S2; no M/R/G.  RESP: Respiratory rate and effort are normal; breath sounds clear and equal bilaterally.  ABD: Soft, NT ND   MSK/EXT: No gross deformities +abrasion to left knee and left hand no ttp nvs in tact   SKIN: Warm and dry;   NEURO: Baseline  PSYCH: Calm & cooperative

## 2024-03-13 NOTE — ED PROVIDER NOTE - OBJECTIVE STATEMENT
this is a 29 yo male presents for evaluation after trip and fall. patient was at program when this happened. as per staff he was Oriented when they went over to him

## 2024-03-13 NOTE — ED PROVIDER NOTE - PATIENT PORTAL LINK FT
You can access the FollowMyHealth Patient Portal offered by [facility] by registering at the following website: [email or web address]. By joining [employer]’s FollowMyHealth portal, you will also be able to view your health information using other applications (apps) compatible with our system.

## 2024-03-13 NOTE — ED PROVIDER NOTE - CLINICAL SUMMARY MEDICAL DECISION MAKING FREE TEXT BOX
28-year-old man history of seizures has not had a seizure in a while developmental delay with a  shunt presents with his mother.  Patient was at a program when he tripped and fell no LOC not on anticoagulation staff immediately saw him not lose consciousness and was not postictal mother states tetanus is up-to-date  vs reviewed imaging obtained and reviewed ct head demonstrated < from: CT Head No Cont (03.13.24 @ 12:09) >  1.  No evidence of acute intracranial abnormality. Stable exam:2.  Multiple ventricular shunt catheters with stable ventricular size.3.  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  < from: Xray Shunt Series (03.13.24 @ 13:14) >Right-sided  shunt catheter is noted terminating in the right pelvis.The shunt overlying the medial liver is not well-demonstrated limiting evaluation for shunt continuityCatheter configuration overlying the calvarium appears stable since prior examination.There is a scoliosis.The visualized paranasal sinuses and mastoid air cells are well-aerated.The partially imaged lung fields demonstrate no focal opacity.Partially imaged abdomen demonstrates no evidence of obstruction. Abrasion cleaned. Patient's mother spoken to in detail about results  All questions addressed.  Results of ED work up discussed and patient given a copy of the results. Patient has proper follow up. Return precautions given.

## 2024-03-13 NOTE — ED ADULT NURSE REASSESSMENT NOTE - NS ED NURSE REASSESS COMMENT FT1
Pt received from previous rn, pt awake, alert, resps even and unlabored, pending results, mom at bedside.

## 2024-03-13 NOTE — ED ADULT NURSE NOTE - NSFALLRISKINTERV_ED_ALL_ED
